# Patient Record
Sex: MALE
[De-identification: names, ages, dates, MRNs, and addresses within clinical notes are randomized per-mention and may not be internally consistent; named-entity substitution may affect disease eponyms.]

---

## 2018-08-30 ENCOUNTER — HOSPITAL ENCOUNTER (EMERGENCY)
Dept: HOSPITAL 31 - C.ER | Age: 63
Discharge: HOME | End: 2018-08-30
Payer: COMMERCIAL

## 2018-08-30 VITALS
TEMPERATURE: 98.1 F | OXYGEN SATURATION: 99 % | SYSTOLIC BLOOD PRESSURE: 128 MMHG | HEART RATE: 83 BPM | RESPIRATION RATE: 20 BRPM | DIASTOLIC BLOOD PRESSURE: 79 MMHG

## 2018-08-30 VITALS — BODY MASS INDEX: 21.5 KG/M2
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
## 2018-08-30 DIAGNOSIS — S39.012A: Primary | ICD-10-CM

## 2018-08-30 DIAGNOSIS — I10: ICD-10-CM

## 2018-08-30 DIAGNOSIS — G89.29: ICD-10-CM

## 2018-08-30 NOTE — C.PDOC
History Of Present Illness


63-year-old male presents to the ED for evaluation of chronic back pain. 

Patient states he has been wearing a lifting belt. Patient is on extensive pain 

and narcotic regimen for chronic lower back pain. Patient is already taking 

Percocet. He was seen standing, sitting and even crawling under the ED bed with 

no distress. Patient denies any new trauma/injuries, extremity numbness/

weakness. 


Time Seen by Provider: 08/30/18 23:34


Chief Complaint (Nursing): Back Pain


History Per: Patient


History/Exam Limitations: no limitations


Onset/Duration Of Symptoms: Hrs


Current Symptoms Are (Timing): Still Present


Quality Of Discomfort: "Pain"


Previous Symptoms: Back Pain


Associated Symptoms: denies: New Weakness, New Numbness


Additional History Per: Patient





Past Medical History


Reviewed: Historical Data, Nursing Documentation, Vital Signs


Vital Signs: 


 Last Vital Signs











Temp  98.1 F   08/30/18 22:47


 


Pulse  83   08/30/18 22:47


 


Resp  20   08/30/18 22:47


 


BP  128/79   08/30/18 22:47


 


Pulse Ox  99   08/30/18 23:51














- Medical History


PMH: HTN


   Denies: Chronic Kidney Disease


Surgical History: No Surg Hx


Family History: States: Unknown Family Hx





- Social History


Hx Alcohol Use: No


Hx Substance Use: No





Review Of Systems


Musculoskeletal: Positive for: Back Pain


Neurological: Negative for: Weakness, Numbness





Physical Exam





- Physical Exam


Appears: Non-toxic, No Acute Distress


Skin: Normal Color, Warm, Dry


Head: Atraumatic, Normacephalic


Eye(s): bilateral: Other (+ pupilary miosis)


Oral Mucosa: Moist


Neck: Supple


Chest: Symmetrical, No Deformity, No Tenderness


Cardiovascular: Rhythm Regular


Respiratory: Normal Breath Sounds, No Accessory Muscle Use


Back: Paraspinal Tenderness (minor, right-sided lumbar and thoracic ), No 

Straight Leg Raising


Extremity: Normal ROM (to bilateral lower extremities ), Capillary Refill (less 

than 2 seconds )


Neurological/Psych: Oriented x3, Normal Speech, Normal Cognition


Gait: Steady





ED Course And Treatment


O2 Sat by Pulse Oximetry: 99 (on RA)


Pulse Ox Interpretation: Normal


Progress Note: Motrin PO given.





Medical Decision Making


Medical Decision Making: 





minor lower R paraspinal lumbar/thoracic muscle strain


no spinal involvement


NSAIDS/ice trialed and educated.


already has extensive chronic pain regimen and taking Percocet





Disposition


Doctor Will See Patient In The: Office


Counseled Patient/Family Regarding: Studies Performed, Diagnosis





- Disposition


Referrals: 


Jorgito Trinh MD [Medical Doctor] - 


Disposition: HOME/ ROUTINE


Disposition Time: 23:44


Condition: GOOD


Additional Instructions: 


bolsa de hielo 1/2 hora por hora, nada caliente


ibuprofeno/Advil 400-600 mg cada 6 horas christen necessario





No pone nada caliente encima de engle espalda- se hace mas inflammada


Sigue con engle medico christen necessario





Instructions:  Muscle Strain (DC)


Forms:  Fotofeedback (English)


Print Language: Welsh





- Clinical Impression


Clinical Impression: 


 Low back strain, Chronic pain








- Scribe Statement


The provider has reviewed the documentation as recorded by the Scribe (Pretty Degroot)


Provider Attestation: 








All medical record entries made by the Scribe were at my direction and 

personally dictated by me. I have reviewed the chart and agree that the record 

accurately reflects my personal performance of the history, physical exam, 

medical decision making, and the department course for this patient. I have 

also personally directed, reviewed, and agree with the discharge instructions 

and disposition.